# Patient Record
Sex: MALE | Race: WHITE
[De-identification: names, ages, dates, MRNs, and addresses within clinical notes are randomized per-mention and may not be internally consistent; named-entity substitution may affect disease eponyms.]

---

## 2020-02-17 NOTE — HP
CC:  Dr. Polo Mena *

 

HISTORY AND PHYSICAL:

 

DATE OF PLANNED ADMISSION AND SURGERY:  20

 

HISTORY OF PRESENT ILLNESS:  Mr. Nunez is a 74-year-old white male who is 
admitted with a long history of a bladder outlet obstruction, prostate 
enlargement for transurethral resection of the prostate.

 

I have been following Mr. Nunez since  because of prostate enlargement, 
bladder outlet obstruction and PSA elevation.  In 2007, he underwent a 
transrectal ultrasound and multiple prostate biopsies because of a PSA of 5.2.  
At that time, the prostate volume measured 86 cc and the biopsies were 
negative.  He was then started on finasteride.  His voiding symptoms improved 
and his PSA went down and remained stable around of 2 .

 

Over the last several years, he started having increasing obstructive voiding 
symptoms with slow stream, hesitancy, intermittency, and feeling of incomplete 
bladder emptying.  He had an elevated postvoid residual of about 130 cc.  He 
was started on tamsulosin; but he noticed no improvement in his voiding.

 

The patient then had a cystoscopy which showed a large obstructing prostate 
with a prominent median lobe causing most of the bladder outlet obstruction.  
The bladder showed heavy trabeculations and he also had a 3-cm diverticulum 
arising from the right posterior bladder wall.  There were no calculi and no 
bladder lesions seen.

 

The patient then had urodynamic studies which showed a high voiding detrusor 
pressure, slow flow, and a moderate increase of the postvoid residual.

With the above history and findings, the cystoscopy and urodynamics findings, 
and the failure of medical treatment, the patient decided to proceed with a 
TURP.

 

PAST MEDICAL HISTORY AND SYSTEM REVIEW:  He is in very good health for his age.
  He is fairly active, chops wood, and works around the house.  He denies any 
chest pain or shortness of breath.  His only prescription medication is 
finasteride 5 mg daily.  He denies any cardiac or pulmonary diseases or 
symptoms.  No hospitalizations and no surgeries.

The patient does not drink, does not use recreational drugs.  His mental 
history is negative.



ALLERGIES:  He denies any allergies to medications.

 

FAMILY HISTORY:  Relevant for his father who  at the age of 61; however, he 
was a very heavy smoker and had history of heavy alcohol abuse.  His mother 
 of old age.  

 

                               PHYSICAL EXAMINATION

 

GENERAL:  Pleasant, healthy, and fit-looking white male who looks good for his 
age.

 

VITAL SIGNS:  Blood pressure 140/80, pulse of 80, oxygen saturation 96%, 
temperature 97.5.

 

LUNGS:  Clear.

 

HEART:  Regular and rhythmic, no murmurs.

 

ABDOMEN:  Soft, no masses, no tenderness.  No CVA tenderness.

 

:  External genitalia: he is circumcised.  No penile lesions.  Normal testes.
  No inguinal hernias.

 

RECTAL:  Showed an enlarged, but nonsuspicious prostate.

 

IMPRESSION:  Prostate enlargement and obstruction with failure of medical 
treatment.  Most of the obstruction is secondary to a large obstructing median 
lobe.

 

PLAN/RECOMMENDATIONS:  I had a long-discussion with the patient regarding the 
options of management.  He will do well with a TURP considering the cystoscopy 
findings and the urodynamic studies.

 

I discussed the other options, namely Urolift and Rezum, although their success 
would be limited by the large median lobe.

 

I discussed the potential complications of the TURP including the risk of 
bleeding, infection, urethral strictures, and a small incidence of urinary 
incontinence.  All his questions were answered.

 

 

 

[id number] #: [id number]

[facility]

## 2020-02-26 ENCOUNTER — HOSPITAL ENCOUNTER (OUTPATIENT)
Dept: HOSPITAL 25 - OR | Age: 75
Setting detail: OBSERVATION
LOS: 1 days | Discharge: HOME | End: 2020-02-27
Attending: UROLOGY | Admitting: UROLOGY
Payer: MEDICARE

## 2020-02-26 DIAGNOSIS — Z79.899: ICD-10-CM

## 2020-02-26 DIAGNOSIS — N32.3: ICD-10-CM

## 2020-02-26 DIAGNOSIS — N13.8: ICD-10-CM

## 2020-02-26 DIAGNOSIS — N40.1: Primary | ICD-10-CM

## 2020-02-26 PROCEDURE — 0TJB8ZZ INSPECTION OF BLADDER, VIA NATURAL OR ARTIFICIAL OPENING ENDOSCOPIC: ICD-10-PCS | Performed by: UROLOGY

## 2020-02-26 PROCEDURE — 96365 THER/PROPH/DIAG IV INF INIT: CPT

## 2020-02-26 PROCEDURE — 88305 TISSUE EXAM BY PATHOLOGIST: CPT

## 2020-02-26 PROCEDURE — 0VT08ZZ RESECTION OF PROSTATE, VIA NATURAL OR ARTIFICIAL OPENING ENDOSCOPIC: ICD-10-PCS | Performed by: UROLOGY

## 2020-02-26 PROCEDURE — 96361 HYDRATE IV INFUSION ADD-ON: CPT

## 2020-02-26 PROCEDURE — G0378 HOSPITAL OBSERVATION PER HR: HCPCS

## 2020-02-26 RX ADMIN — SODIUM CHLORIDE SCH MLS/HR: 900 IRRIGANT IRRIGATION at 12:10

## 2020-02-26 RX ADMIN — SODIUM CHLORIDE SCH MLS/HR: 900 IRRIGANT IRRIGATION at 18:57

## 2020-02-27 VITALS — DIASTOLIC BLOOD PRESSURE: 67 MMHG | SYSTOLIC BLOOD PRESSURE: 154 MMHG

## 2020-02-27 RX ADMIN — SODIUM CHLORIDE SCH MLS/HR: 900 IRRIGANT IRRIGATION at 01:48

## 2020-02-27 NOTE — DS
Amended report to correct date of admission and date of discharge.



DISCHARGE SUMMARY:

 

DATE OF ADMISSION:  02/26/20

 

DATE OF DISCHARGE:  02/27/20

 

FINAL DIAGNOSES:

1.  Benign prostatic hyperplasia.

2.  Bladder diverticulum.

3.  Bladder outlet obstruction due to above.

 

OPERATION PERFORMED:  Cystoscopy and transurethral resection of the prostate.

 

HISTORY:  Mr. Nunez is a 74-year-old white male who I have been following 
since 2007 because of prostate enlargement and bladder outlet obstruction and 
borderline PSA elevation.  Prostate biopsies were negative.  He had been 
maintained on tamsulosin and finasteride.  His voiding symptoms have gotten 
worse recently with slower stream, hesitancy, nocturia and feeling of 
incomplete bladder emptying. Workup with cystoscopy showed a very large 
obstructing prostate with a prominent median and lateral lobes.  The bladder 
showed diffuse trabeculations and there was a 3 to 4 cm bladder diverticulum.  
Urodynamic studies showed a high voiding detrusor pressure consistent with 
bladder outlet obstruction and good bladder function.

 

Because of the above history and persistent and worsening voiding symptoms 
while on full medical treatment and after discussing the options of management, 
the patient elected to proceed with TURP.

 

PAST MEDICAL HISTORY AND SYSTEM REVIEW:  He is in excellent health.  He is 
physically active and he denies any cardiac or pulmonary diseases or symptoms.  
His only prescription medication is the finasteride.

 

Preoperative physical exam was within normal.  Rectal examination showed a 
large but benign feeling prostate.

 

COURSE IN HOSPITAL:  The patient was admitted the morning of his surgery.  He 
underwent an uncomplicated transurethral resection of the prostate under spinal 
anesthesia.  The prostate was large, obstructing and moderately vascular but 
the at the completion of the procedure, the prostatic urethra was wide open.  
The patient was kept for observation overnight.  He did have bladder spasms but 
otherwise did very well and by the morning, his urine was clear, his vital 
signs were normal and he has no complaints.

 

The patient is being discharged home in good condition, on catheter drainage, 
on his first postoperative day.  His urine is clear, his vital signs are normal 
and he has no complaints.

Pathology showed benign resected prostate tissue.

 

DISCHARGE MEDICATIONS:  Include:

1.  Finasteride.

2.  Advil PM as needed for pain and bladder spasms.

 

Instructions were given for catheter care.  I will see patient in my office 
next week and the Alvares catheter  will be removed.

 

 [id number] #: [id number]

[facility] Hospital[facility]ME[facility]

## 2020-02-27 NOTE — OP
CC:  Dr. Dunham *

 

DATE OF OPERATION:  02/26/20 - ROOM #333

 

DATE OF BIRTH:  [birth year]

 

SURGEON:  Jesus Manuel Yepez MD

 

ANESTHESIOLOGIST:  Dr. Mele Manrique.

 

ANESTHESIA:  Spinal.

 

PRE-OP DIAGNOSES:

1.  Benign prostatic hyperplasia.

2.  Bladder outlet obstruction due to above.

3.  Bladder diverticulum.

 

POST-OP DIAGNOSES:

1.  Benign prostatic hyperplasia.

2.  Bladder outlet obstruction due to above.

3.  Bladder diverticulum.

 

OPERATIVE PROCEDURE:

1.  Cystoscopy.

2.  Transurethral resection of the prostate.

 

INDICATIONS FOR PROCEDURE:  Mr. Nunez is a 74-year-old white male with long 
history of prostate enlargement and bladder outlet obstruction.  He has been 
maintained on tamsulosin and finasteride.  He continued to be significantly 
symptomatic with nocturia, slow stream, frequency, and incomplete bladder 
emptying. Cystoscopy showed a large obstructing prostate with a prominent 
median lobe.  There was a 3 cm diverticulum arising from the right 
posterolateral bladder wall. Urodynamic studies showed high voiding detrusor 
pressure and an elevated postvoid residual.

 

With above history and findings and with the failure of medical treatment and 
persistent symptoms, the patient elected to proceed with TURP.

 

PATHOLOGY:  At cystoscopy, the penile and bulbar urethrae looked normal.  The 
prostatic urethra was large, measuring about 4 cm in diameter.  There were 
large vascular lateral lobes and a very large median lobe.

 

Examination of the bladder showed moderate diffuse trabeculations. There were 
no suspicious bladder lesions seen. The ureteral orifices looked normal.  A 3 
to 4 cm diverticulum was noted arising from the right posterior lateral bladder 
wall.

 

The prostate adenoma was large, moderately vascular.

 

DESCRIPTION OF PROCEDURE:  After successful spinal anesthesia, the patient was 
placed in the lithotomy position and was prepped and draped for a cystoscopy. 
Cystoscopy was performed.  The bladder was inspected and the above findings 
were noted.

 

The resectoscope was then introduced inside the bladder.  Mannitol/sorbitol 
solution was used for irrigation and the inflow and outflow were adjusted to 
avoid overdistention of the bladder.

 

The median lobe was resected first down to the posterior bladder neck muscles.  
The protruding portions of the lateral lobes inside the bladder neck were then 
resected circumferentially.  Because of the very large size of the prostate, 
the resection had to be done in stages.  The resectoscope was positioned in the 
mid prostatic urethra and the prostate adenoma was resected circumferentially.  
This was repeated until the resectoscope was at the level of the verumontanum.  
At that level, the right lobe was resected, starting at 5 o'clock and 
proceeding anteriorly.  The left lobe was resected next.  The residual apical 
tissue and anterior tissue were resected last.  The limits of the resection 
were the bladder neck proximally, the verumontanum distally and the capsule 
circumferentially.  There was oozing of venous blood from an open sinus at 12 o'
clock proximal to the bladder neck and at 11 o'clock.  All the significant 
bleeders were controlled with cautery.  Irrigation of the bladder was then 
carried, evacuating all the prostate chips.

 

At the completion of the resection, there was still adenomatous tissue 
including at the level of the apex, but it did not seem to be obstructing.  
There were no arterial bleeders noted.  There was still venous oozing noted 
from the above described areas.  The ureteral orifices were intact.  The 
bladder wall was intact. There were no residual prostate chips.

 

The resectoscope was then removed and a size 24-Maori Alvares catheter was 
placed insider the bladder and the balloon inflated with 30 cc of water.  The 
catheter was then placed under gentle traction and irrigated and the irrigation 
came back clear.

 

The Alvares catheter was then taped to the right thigh of the patient on gentle 
traction.  Irrigation yielded clear returns.

 

The patient tolerated the procedure well and left the operating room in good 
condition.  The blood loss was estimated at about 100 cc.  The specimen was 
prostate chips.

 

 [id number] #: [id number]

[facility]